# Patient Record
Sex: MALE | Race: WHITE | Employment: UNEMPLOYED | URBAN - METROPOLITAN AREA
[De-identification: names, ages, dates, MRNs, and addresses within clinical notes are randomized per-mention and may not be internally consistent; named-entity substitution may affect disease eponyms.]

---
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
## 2023-06-23 ENCOUNTER — HOSPITAL ENCOUNTER (OUTPATIENT)
Age: 18
Discharge: HOME | End: 2023-06-23
Payer: COMMERCIAL

## 2023-06-23 VITALS
HEART RATE: 87 BPM | WEIGHT: 165 LBS | DIASTOLIC BLOOD PRESSURE: 71 MMHG | HEIGHT: 73 IN | OXYGEN SATURATION: 98 % | TEMPERATURE: 97 F | SYSTOLIC BLOOD PRESSURE: 117 MMHG | BODY MASS INDEX: 21.87 KG/M2 | RESPIRATION RATE: 18 BRPM

## 2023-06-23 DIAGNOSIS — J02.9 SORE THROAT: Primary | ICD-10-CM

## 2023-06-23 LAB — S PYO AG THROAT QL: NEGATIVE

## 2023-06-23 PROCEDURE — 87880 STREP A ASSAY W/OPTIC: CPT | Performed by: PHYSICIAN ASSISTANT

## 2023-06-23 PROCEDURE — 99213 OFFICE O/P EST LOW 20 MIN: CPT | Performed by: PHYSICIAN ASSISTANT

## 2023-07-24 ENCOUNTER — HOSPITAL ENCOUNTER (OUTPATIENT)
Age: 18
Discharge: HOME | End: 2023-07-24
Payer: COMMERCIAL

## 2023-07-24 VITALS
BODY MASS INDEX: 24.52 KG/M2 | DIASTOLIC BLOOD PRESSURE: 68 MMHG | RESPIRATION RATE: 18 BRPM | HEIGHT: 73 IN | HEART RATE: 75 BPM | WEIGHT: 185 LBS | SYSTOLIC BLOOD PRESSURE: 124 MMHG | OXYGEN SATURATION: 99 % | TEMPERATURE: 98 F

## 2023-07-24 DIAGNOSIS — R09.82 POSTNASAL DRIP: ICD-10-CM

## 2023-07-24 DIAGNOSIS — J02.9 ACUTE VIRAL PHARYNGITIS: Primary | ICD-10-CM

## 2023-07-24 LAB — S PYO AG THROAT QL: NEGATIVE

## 2023-07-24 PROCEDURE — 87880 STREP A ASSAY W/OPTIC: CPT | Performed by: NURSE PRACTITIONER

## 2023-07-24 PROCEDURE — 99213 OFFICE O/P EST LOW 20 MIN: CPT | Performed by: NURSE PRACTITIONER

## 2023-07-24 RX ORDER — PREDNISONE 20 MG/1
20 TABLET ORAL DAILY
Qty: 3 TABLET | Refills: 0 | Status: SHIPPED | OUTPATIENT
Start: 2023-07-24 | End: 2023-07-27

## 2023-07-24 RX ORDER — PREDNISONE 20 MG/1
20 TABLET ORAL DAILY
Qty: 3 TABLET | Refills: 0 | Status: SHIPPED | OUTPATIENT
Start: 2023-07-24 | End: 2023-07-24

## 2024-07-24 ENCOUNTER — HOSPITAL ENCOUNTER (OUTPATIENT)
Age: 19
Discharge: HOME | End: 2024-07-24
Payer: COMMERCIAL

## 2024-07-24 VITALS
DIASTOLIC BLOOD PRESSURE: 80 MMHG | WEIGHT: 170 LBS | SYSTOLIC BLOOD PRESSURE: 120 MMHG | HEART RATE: 84 BPM | TEMPERATURE: 98 F | RESPIRATION RATE: 16 BRPM | BODY MASS INDEX: 22.53 KG/M2 | OXYGEN SATURATION: 100 % | HEIGHT: 73 IN

## 2024-07-24 DIAGNOSIS — B34.9 ACUTE VIRAL SYNDROME: Primary | ICD-10-CM

## 2024-07-24 PROCEDURE — 99213 OFFICE O/P EST LOW 20 MIN: CPT | Performed by: NURSE PRACTITIONER

## 2024-07-24 NOTE — ED PROVIDER NOTES
Patient Seen in: [facility] [place]      History     Chief Complaint   Patient presents with    Cough/URI    Headache     Stated Complaint: runny nose    Subjective:   HPI    19-year-old male presents to the immediate care with complaints of runny nose cough congestion last 2 days patient is he just needs a note to return to work.  States he is feeling much better denies any COVID testing.  Patient is no other issues complaints or concerns.  The patient's medication list, past medical history and social history elements as listed in today's nurse's notes were reviewed and agreed (except as otherwise stated in the HPI).  The patient's family history reviewed and determined to be noncontributory to the presenting problem.      Objective:   History reviewed. No pertinent past medical history.           Past Surgical History:   Procedure Laterality Date    Other surgical history Right 2023    ACL repair                No pertinent social history.            Review of Systems    Positive for stated Chief Complaint: Cough/URI and Headache    Other systems are as noted in HPI.  Constitutional and vital signs reviewed.      All other systems reviewed and negative except as noted above.    Physical Exam     ED Triage Vitals [07/24/24 1013]   /80   Pulse 84   Resp 16   Temp 97.7 °F (36.5 °C)   Temp src Temporal   SpO2 100 %   O2 Device None (Room air)       Current Vitals:   Vital Signs  BP: 120/80  Pulse: 84  Resp: 16  Temp: 97.7 °F (36.5 °C)  Temp src: Temporal    Oxygen Therapy  SpO2: 100 %  O2 Device: None (Room air)            Physical Exam    GENERAL: The patient is well-developed well-nourished nontoxic, non-ill-appearing  HEENT: Normocephalic.  Atraumatic.  Extraocular motions are intact  NECK: Supple.  No meningitic signs are noted.   CHEST/LUNGS: Clear to auscultation.  There is no respiratory distress noted.  HEART/CARDIOVASCULAR: Regular.  There is no tachycardia.   SKIN: There is no rash.  NEURO: The  patient is awake, alert, and oriented.  The patient is cooperative.    ED Course   Labs Reviewed - No data to display                   MDM   Pertinent Labs & Imaging studies reviewed. (See chart for details)  Differential diagnosis considered but not limited to: Viral syndrome viral URI note to go back to work  Patient coming in with nasal congestion and then noted to go back to work. Will treat for possible viral syndrome. Will discharge on, supportive care see discharge for instructions. Patient is comfortable with this plan.  Overall Pt looks good. Non-toxic, well-hydrated and in no respiratory distress. Vital signs are reassuring. Exam is reassuring. I do not believe pt  requires and additional  diagnostic studiesor intervention. I believe pt  can be discharged home to continue evaluation as an outpatient. Follow-up provider given. Discharge instructions given and reviewed. Return for any problems. All understand and agreewith the plan.    Please note that this report has been produced using speech recognition software and may contain errors related to that system including, but not limited to, errors in grammar, punctuation, and spelling, as well as words and phrases that possibly may have been recognized inappropriately.  If there are any questions or concerns, contact the dictating provider for clarification.    Note to patient: The 21st Century Cures Act makes medical notes like these available to patients in the interest of transparency. However, this is a medical document intended as peer to peer communication. It is written in medical language and may contain abbreviations or verbiage that are unfamiliar. It may appear blunt or direct. Medical documents are intended to carry relevant information, facts as evident, and the clinical opinion of the practitioner.                                      Medical Decision Making      Disposition and Plan     Clinical Impression:  1. Acute viral syndrome          Disposition:  Discharge  7/24/2024 10:25 am    Follow-up:  No follow-up provider specified.        Medications Prescribed:  Discharge Medication List as of 7/24/2024 10:26 AM

## 2025-07-16 ENCOUNTER — HOSPITAL ENCOUNTER (OUTPATIENT)
Age: 20
Discharge: HOME | End: 2025-07-16
Payer: COMMERCIAL

## 2025-07-16 VITALS
HEART RATE: 64 BPM | TEMPERATURE: 98 F | SYSTOLIC BLOOD PRESSURE: 128 MMHG | RESPIRATION RATE: 18 BRPM | DIASTOLIC BLOOD PRESSURE: 69 MMHG | OXYGEN SATURATION: 99 %

## 2025-07-16 DIAGNOSIS — L23.7 POISON IVY DERMATITIS: Primary | ICD-10-CM

## 2025-07-16 PROCEDURE — 99214 OFFICE O/P EST MOD 30 MIN: CPT | Performed by: NURSE PRACTITIONER

## 2025-07-16 RX ORDER — PREDNISONE 10 MG/1
TABLET ORAL
Qty: 45 TABLET | Refills: 0 | Status: SHIPPED | OUTPATIENT
Start: 2025-07-16 | End: 2025-07-31

## 2025-07-16 NOTE — ED PROVIDER NOTES
Patient Seen in: [facility] [place]        History  Chief Complaint   Patient presents with    Rash Skin Problem     Stated Complaint: Poison ivy    Subjective:   20-year-old male, who 2 days ago got in contact with poison ivy is having an itchy rash to the arm and face.  No trouble breathing.  No fevers.  No vomiting.  No throat tightness.  Was exposed to poison ivy a couple weeks ago which resolved with oral steroids.                      Objective:     History reviewed. No pertinent past medical history.           Past Surgical History:   Procedure Laterality Date    Other surgical history Right 2023    ACL repair                Social History     Socioeconomic History    Marital status: Single   Tobacco Use    Smoking status: Passive Smoke Exposure - Never Smoker   Vaping Use    Vaping status: Some Days              Review of Systems   Constitutional: Negative.    Skin:  Positive for rash.   All other systems reviewed and are negative.      Positive for stated complaint: Poison ivy  Other systems are as noted in HPI.  Constitutional and vital signs reviewed.      All other systems reviewed and negative except as noted above.                  Physical Exam    ED Triage Vitals [07/16/25 0843]   /69   Pulse 64   Resp 18   Temp 97.7 °F (36.5 °C)   Temp src Oral   SpO2 99 %   O2 Device None (Room air)       Current Vitals:   Vital Signs  BP: 128/69  Pulse: 64  Resp: 18  Temp: 97.7 °F (36.5 °C)  Temp src: Oral    Oxygen Therapy  SpO2: 99 %  O2 Device: None (Room air)            Physical Exam  Vitals and nursing note reviewed.   Constitutional:       General: He is not in acute distress.     Appearance: Normal appearance. He is not ill-appearing, toxic-appearing or diaphoretic.   HENT:      Head:      Jaw: No trismus.      Mouth/Throat:      Lips: Pink. No lesions.      Mouth: Mucous membranes are moist. No oral lesions or angioedema.      Tongue: No lesions.      Palate: No lesions.      Pharynx: Oropharynx  is clear. Uvula midline.   Cardiovascular:      Rate and Rhythm: Normal rate and regular rhythm.      Pulses: Normal pulses.      Heart sounds: Normal heart sounds.   Pulmonary:      Effort: Pulmonary effort is normal. No respiratory distress.      Breath sounds: Normal breath sounds. No stridor. No wheezing, rhonchi or rales.   Musculoskeletal:      Cervical back: Normal range of motion and neck supple.   Skin:     General: Skin is warm and dry.      Coloration: Skin is not jaundiced or pale.      Findings: Rash present.      Comments: Erythematous papules to the arm and to the face.   Neurological:      Mental Status: He is alert and oriented to person, place, and time.   Psychiatric:         Behavior: Behavior normal.                 ED Course  Labs Reviewed - No data to display                       MDM             Medical Decision Making  20-year-old male with a subjective history, physical exam findings/symptoms consistent with poison ivy dermatitis.  No signs of anaphylaxis.  No signs of secondary bacterial infection.    Supportive/home management of diagnosis/illness/injury discussed. Red flag symptoms discussed.  Signs and symptoms/criteria that would necessitate reevaluation, including ER evaluation discussed.  Patient and/or responsible adult verbalize and agree with management and plan of care.    Speech recognition software was used during this dictation.  There may be minor errors in transcription.      Risk  OTC drugs.  Prescription drug management.        Disposition and Plan     Clinical Impression:  1. Poison ivy dermatitis         Disposition:  Discharge  7/16/2025  8:52 am    Follow-up:  Your primary care provider    Schedule an appointment as soon as possible for a visit       The emergency department    Go to   If symptoms worsen          Medications Prescribed:  Current Discharge Medication List        START taking these medications    Details   predniSONE 10 MG Oral Tab Take 5 tablets (50 mg  total) by mouth daily for 3 days, THEN 4 tablets (40 mg total) daily for 3 days, THEN 3 tablets (30 mg total) daily for 3 days, THEN 2 tablets (20 mg total) daily for 3 days, THEN 1 tablet (10 mg total) daily for 3 days.  Qty: 45 tablet, Refills: 0                   Supplementary Documentation:

## 2025-07-16 NOTE — ED INITIAL ASSESSMENT (HPI)
Patient had poison ivy 2 weeks ago and was treated at a [facility] with 10 days of steroids. He was better and then re-exposed 2 days ago. He has the rash to his face - near right eye, torso, and bilateral arms.

## 2025-07-16 NOTE — DISCHARGE INSTRUCTIONS
Take the prednisone as prescribed.  Over-the-counter calamine lotion.  Over-the-counter Benadryl 25 to 50 mg every 6 hours as needed for itching.

## (undated) NOTE — LETTER
[address] [facility]  Dept: [phone]  Dept Fax: [phone]         July 24, 2023    Patient: Marbin Neal   YOB: 2005   Date of Visit: 7/24/2023       To Whom It May Concern:    Eleuterio Guevara was seen and treated in our Immediate Care department on 7/24/2023. He may return to work on 7/25/2023 . If you have any questions or concerns, please don't hesitate to call.       Encounter Provider(s):    Garrick Mcfarland June, APRN     7:45 PM  7/24/2023

## (undated) NOTE — LETTER
January 17, 2017    Patient: Gunner Alcantar   Date of Visit: 1/17/2017       To Whom It May Concern:    Jaxon Ramirez was seen and treated in our emergency department on 1/17/2017. He should not participate in gym/sports until 1/24/2017.     If you

## (undated) NOTE — LETTER
January 17, 2017    Patient: Gunner Alcantar   Date of Visit: 1/17/2017       To Whom It May Concern:    Jaxon Ramirez was seen and treated in our emergency department on 1/17/2017. He should not return to school until 1/18/2017.     If you have any

## (undated) NOTE — LETTER
Date & Time: 7/24/2024, 10:25 AM  Patient: Tesfaye Carrasco  Encounter Provider(s):    George Bellamy APRN       To Whom It May Concern:    Tesfaye Carrasco was seen and treated in our department on 7/24/2024. He can return to work.    If you have any questions or concerns, please do not hesitate to call.        _____________________________  Physician/APC Signature

## (undated) NOTE — LETTER
Date & Time: 6/23/2023, 8:39 AM  Patient: Joshua Tomlinson [facility]  Encounter Provider(s):    SHAHRZAD Montoya       To Whom It May Concern:    Armida Akbar was seen and treated in our department on 6/23/2023. Please excuse any absence this week due to illness. He may return to work without restrictions.     If you have any questions or concerns, please do not hesitate to call.        _____________________________  Physician/APC Signature

## (undated) NOTE — ED AVS SNAPSHOT
THE [facility] Emergency Department in [address]    Phone:  [phone]    Fax:  [address]   MRN: [id number]    Department:  THE [facility] Emergency Department in [place]   Date of Visi IF THERE IS ANY CHANGE OR WORSENING OF YOUR CONDITION, CALL YOUR PRIMARY CARE PHYSICIAN AT ONCE OR RETURN IMMEDIATELY TO THE EMERGENCY DEPARTMENT.     If you have been prescribed any medication(s), please fill your prescription right away and begin taking t

## (undated) NOTE — ED AVS SNAPSHOT
THE [facility] Emergency Department in [address] [facility]    Phone:  [phone]    Fax:  [address]   MRN: [id number]    Department:  THE [facility] Emergency Department in [place]   Date of Visi Zofran as needed for nausea every 4 hours  Follow up with your primary care doctor  NO gym or sports for 1 weeks    Discharge References/Attachments     HEAD INJURY (CHILD) (ENGLISH)      Disclosure     Insurance plans vary and the physician(s) referred by CARE PHYSICIAN AT ONCE OR RETURN IMMEDIATELY TO THE EMERGENCY DEPARTMENT.     If you have been prescribed any medication(s), please fill your prescription right away and begin taking the medication(s) as directed    If the emergency physician has read X-ray coverage. Patient [address] is a [employer] program that can help with your Affordable Care Act coverage, as well as all types of Medicaid plans.   To get signed up and covered, please call [phone] and ask to get set up for an insuran

## (undated) NOTE — LETTER
Date & Time: 6/23/2023, 8:34 AM  Patient: Basil Siddiqui [facility]  Encounter Provider(s):    SHAHRZAD Ford       To Whom It May Concern:    Ginette Angel was seen and treated in our department on 6/23/2023. He can return to work.     If you have any questions or concerns, please do not hesitate to call.        _____________________________  Physician/APC Signature

## (undated) NOTE — LETTER
Date & Time: 11/10/2022, 12:49 PM  Patient: Sunitha Graf [facility]  Encounter Provider(s):    SHAHRZAD Benitez       To Whom It May Concern:    Analia Bowen was seen and treated in our department on 11/10/2022. He should not return to work until cleared by ortho.     If you have any questions or concerns, please do not hesitate to call.        _____________________________  Physician/APC Signature